# Patient Record
Sex: MALE | Race: WHITE | URBAN - METROPOLITAN AREA
[De-identification: names, ages, dates, MRNs, and addresses within clinical notes are randomized per-mention and may not be internally consistent; named-entity substitution may affect disease eponyms.]

---

## 2018-02-02 ENCOUNTER — EMERGENCY (EMERGENCY)
Facility: HOSPITAL | Age: 38
LOS: 1 days | End: 2018-02-02
Payer: MEDICAID

## 2018-02-02 PROCEDURE — 71046 X-RAY EXAM CHEST 2 VIEWS: CPT | Mod: 26

## 2018-02-02 PROCEDURE — 99284 EMERGENCY DEPT VISIT MOD MDM: CPT

## 2024-08-22 ENCOUNTER — RESULT REVIEW (OUTPATIENT)
Age: 44
End: 2024-08-22

## 2024-08-26 PROBLEM — Z00.00 ENCOUNTER FOR PREVENTIVE HEALTH EXAMINATION: Status: ACTIVE | Noted: 2024-08-26

## 2024-08-30 ENCOUNTER — APPOINTMENT (OUTPATIENT)
Dept: CARDIOLOGY | Facility: CLINIC | Age: 44
End: 2024-08-30
Payer: SELF-PAY

## 2024-08-30 VITALS
OXYGEN SATURATION: 98 % | HEART RATE: 70 BPM | WEIGHT: 187 LBS | BODY MASS INDEX: 29.35 KG/M2 | DIASTOLIC BLOOD PRESSURE: 68 MMHG | SYSTOLIC BLOOD PRESSURE: 124 MMHG | HEIGHT: 67 IN

## 2024-08-30 DIAGNOSIS — Z82.49 FAMILY HISTORY OF ISCHEMIC HEART DISEASE AND OTHER DISEASES OF THE CIRCULATORY SYSTEM: ICD-10-CM

## 2024-08-30 DIAGNOSIS — Z87.898 PERSONAL HISTORY OF OTHER SPECIFIED CONDITIONS: ICD-10-CM

## 2024-08-30 DIAGNOSIS — I25.110 ATHEROSCLEROTIC HEART DISEASE OF NATIVE CORONARY ARTERY WITH UNSTABLE ANGINA PECTORIS: ICD-10-CM

## 2024-08-30 DIAGNOSIS — Z98.890 OTHER SPECIFIED POSTPROCEDURAL STATES: ICD-10-CM

## 2024-08-30 DIAGNOSIS — Z78.9 OTHER SPECIFIED HEALTH STATUS: ICD-10-CM

## 2024-08-30 DIAGNOSIS — I21.3 ST ELEVATION (STEMI) MYOCARDIAL INFARCTION OF UNSPECIFIED SITE: ICD-10-CM

## 2024-08-30 DIAGNOSIS — Z09 ENCOUNTER FOR FOLLOW-UP EXAMINATION AFTER COMPLETED TREATMENT FOR CONDITIONS OTHER THAN MALIGNANT NEOPLASM: ICD-10-CM

## 2024-08-30 DIAGNOSIS — Z95.5 PRESENCE OF CORONARY ANGIOPLASTY IMPLANT AND GRAFT: ICD-10-CM

## 2024-08-30 DIAGNOSIS — I50.20 UNSPECIFIED SYSTOLIC (CONGESTIVE) HEART FAILURE: ICD-10-CM

## 2024-08-30 PROCEDURE — G2211 COMPLEX E/M VISIT ADD ON: CPT

## 2024-08-30 PROCEDURE — 99215 OFFICE O/P EST HI 40 MIN: CPT

## 2024-08-30 PROCEDURE — 93000 ELECTROCARDIOGRAM COMPLETE: CPT

## 2024-08-30 RX ORDER — LOSARTAN POTASSIUM 25 MG/1
25 TABLET, FILM COATED ORAL
Qty: 90 | Refills: 2 | Status: DISCONTINUED | COMMUNITY
End: 2024-08-30

## 2024-08-30 RX ORDER — ASPIRIN 81 MG/1
81 TABLET ORAL
Refills: 0 | Status: ACTIVE | COMMUNITY

## 2024-08-30 RX ORDER — CLOPIDOGREL BISULFATE 75 MG/1
75 TABLET, FILM COATED ORAL DAILY
Qty: 90 | Refills: 1 | Status: ACTIVE | COMMUNITY

## 2024-08-30 RX ORDER — SACUBITRIL AND VALSARTAN 49; 51 MG/1; MG/1
49-51 TABLET, FILM COATED ORAL TWICE DAILY
Qty: 180 | Refills: 2 | Status: ACTIVE | COMMUNITY
Start: 2024-08-30 | End: 1900-01-01

## 2024-08-30 RX ORDER — EMPAGLIFLOZIN 10 MG/1
10 TABLET, FILM COATED ORAL
Qty: 90 | Refills: 2 | Status: ACTIVE | COMMUNITY
Start: 2024-08-30 | End: 1900-01-01

## 2024-08-30 RX ORDER — METOPROLOL SUCCINATE 50 MG/1
50 TABLET, EXTENDED RELEASE ORAL DAILY
Qty: 90 | Refills: 3 | Status: ACTIVE | COMMUNITY
Start: 1900-01-01 | End: 1900-01-01

## 2024-08-30 RX ORDER — ATORVASTATIN CALCIUM 80 MG/1
80 TABLET, FILM COATED ORAL
Qty: 90 | Refills: 3 | Status: ACTIVE | COMMUNITY

## 2024-08-31 NOTE — CARDIOLOGY SUMMARY
[de-identified] : 0830//2024: SR with anterior Q waves and abnormal STT segments consistent with anterior infarct (manually printed ECG, to be scanned) [de-identified] : TTE 08/22/24 1. Left ventricular cavity is normal in size. Left ventricular systolic function is mildly decreased with an ejection fraction visually estimated at 35 to 40 %. 2. Mid and apical anterior wall, mid and apical anterior septum, mid and apical inferior septum, and apex are abnormal. 3. There is mild (grade 1) left ventricular diastolic dysfunction. 4. Pulmonary artery systolic pressure could not be estimated. 5. The inferior vena cava is normal in size measuring 1.27 cm in diameter, (normal <2.1cm) with normal inspiratory collapse (normal >50%) consistent with normal right atrial pressure ( R 3, range 0-5mmHg).  [de-identified] : 08/22-23/24: Emergent PCI of prox LAD and staged PCI of bystander RPL (see above)

## 2024-08-31 NOTE — HISTORY OF PRESENT ILLNESS
[FreeTextEntry1] : 43 year old gentleman with no previously known medical history who presented with substernal chest pressure and ECG significant for anterior lead Q waves and de Winter's T-waves in V2-V3 on 08/21/24 -- taken emergently to CCL on morning of 08/22/24 for successful IVUS-guided PCI of 100% thrombotically occluded proximal LAD with ZAC x 1 (3.5 mm x 38 mm [place]) --- LVEF 40% on left ventriculogram. Staged IVUS-guided PCI of bystander 99% rPL stenosis with ZAC x 1 (3.0 x 15 mm [place]) performed the following day. Forearm hematoma after first PCI aggressively managed with blood-pressure cuff inflation followed by precise ultrasound-directed pressure at small branch microperforation, and pressure wrap with full resolution. Patient obtained emergent insurance with assistance of  and was told that this this post hospital discharge visit would be covered with minimal co-pay -- today's visit cost over $450 dollars.   Patient doing well and re-engaging in all activities. 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 Warm compresses and massage recommended to speed up healing. Medications very expensive given lack of insurance -- Jardiance and entresto samples were kindly coordinated by our RN staff.   RRA site still has 2+ radial pulse. Forearm is soft, but still areas of layered hematoma causing dicomfort. No numbness, tingling. Mild pain with certain positions, but intact strength.  DISPLAY PLAN FREE TEXT

## 2024-08-31 NOTE — CARDIOLOGY SUMMARY
[de-identified] : 0830//2024: SR with anterior Q waves and abnormal STT segments consistent with anterior infarct (manually printed ECG, to be scanned) [de-identified] : TTE 08/22/24 1. Left ventricular cavity is normal in size. Left ventricular systolic function is mildly decreased with an ejection fraction visually estimated at 35 to 40 %. 2. Mid and apical anterior wall, mid and apical anterior septum, mid and apical inferior septum, and apex are abnormal. 3. There is mild (grade 1) left ventricular diastolic dysfunction. 4. Pulmonary artery systolic pressure could not be estimated. 5. The inferior vena cava is normal in size measuring 1.27 cm in diameter, (normal <2.1cm) with normal inspiratory collapse (normal >50%) consistent with normal right atrial pressure ( R 3, range 0-5mmHg).  [de-identified] : 08/22-23/24: Emergent PCI of prox LAD and staged PCI of bystander RPL (see above)

## 2024-08-31 NOTE — DISCUSSION/SUMMARY
[FreeTextEntry1] : 43 year old gentleman who suffered a significant STEMI-equivalent s/p emergent PCI of culprit prox LAD with successful ZAC x 1 followed by staged-PCI of bystander RPL w/ successful ZAC x 1. Ischemic cardiomyopathy with LVEF 40%. Forearm hematoma, nearly resolved, causing mild discomfort. No active extravasation. Asymptomatic on today's visit and feeling well. No recurrence of chest discomfort.   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   - Atorvastatin 80 mg daily   - Switch losartan to entresto 49-51 mg daily   - Start jardiance 10 mg daily   - Increase metoprolol succinate to 50 mg qPM   - Obtain labs, BNP, CMP, A1c, fasting lipid panel, CBC to be completed prior to next visit.   - Significant samples provided.   Return Visit: To be coordinated; advised patient to follow-up on [employer] (paperowrk provided)   Appreciate the opportunity to participate in the care of Mr. MCDUFFIE. Strict ER precautions were provided to patient for symptoms that arise or worsen. Please do not hesitate to reach out with any questions, concerns, or changes in clinical status.   Manjinder Valladares MD, [facility]  Interventional & Clinical Cardiology  Altogether, I had the privilege of spending 62 minutes on this patient's care, more than 50% of which was during a face-to-face encounter. This does not include time required to obtain/interpret an ECG or time invested in the education of medical trainees who may have participated in the patient's care.  [EKG obtained to assist in diagnosis and management of assessed problem(s)] : EKG obtained to assist in diagnosis and management of assessed problem(s)

## 2024-08-31 NOTE — HISTORY OF PRESENT ILLNESS
[FreeTextEntry1] : 43 year old gentleman with no previously known medical history who presented with substernal chest pressure and ECG significant for anterior lead Q waves and de Winter's T-waves in V2-V3 on 08/21/24 -- taken emergently to CCL on morning of 08/22/24 for successful IVUS-guided PCI of 100% thrombotically occluded proximal LAD with ZAC x 1 (3.5 mm x 38 mm [place]) --- LVEF 40% on left ventriculogram. Staged IVUS-guided PCI of bystander 99% rPL stenosis with ZAC x 1 (3.0 x 15 mm [place]) performed the following day. Forearm hematoma after first PCI aggressively managed with blood-pressure cuff inflation followed by precise ultrasound-directed pressure at small branch microperforation, and pressure wrap with full resolution. Patient obtained emergent insurance with assistance of  and was told that this this post hospital discharge visit would be covered with minimal co-pay -- today's visit cost over $450 dollars.   Patient doing well and re-engaging in all activities. 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 Warm compresses and massage recommended to speed up healing. Medications very expensive given lack of insurance -- Jardiance and entresto samples were kindly coordinated by our RN staff.   RRA site still has 2+ radial pulse. Forearm is soft, but still areas of layered hematoma causing dicomfort. No numbness, tingling. Mild pain with certain positions, but intact strength.

## 2024-08-31 NOTE — DISCUSSION/SUMMARY
[FreeTextEntry1] : 43 year old gentleman who suffered a significant STEMI-equivalent s/p emergent PCI of culprit prox LAD with successful ZAC x 1 followed by staged-PCI of bystander RPL w/ successful ZAC x 1. Ischemic cardiomyopathy with LVEF 40%. Forearm hematoma, nearly resolved, causing mild discomfort. No active extravasation. Asymptomatic on today's visit and feeling well. No recurrence of chest discomfort.   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   - Atorvastatin 80 mg daily   - Switch losartan to entresto 49-51 mg daily   - Start jardiance 10 mg daily   - Increase metoprolol succinate to 50 mg qPM   - Obtain labs, BNP, CMP, A1c, fasting lipid panel, CBC to be completed prior to next visit.   - Significant samples provided.   Return Visit: To be coordinated; advised patient to follow-up on [employer] (paperowrk provided)   Appreciate the opportunity to participate in the care of Mr. MCDUFFIE. Strict ER precautions were provided to patient for symptoms that arise or worsen. Please do not hesitate to reach out with any questions, concerns, or changes in clinical status.   Manjinder Valladares MD, [facility]  Interventional & Clinical Cardiology  Altogether, I had the privilege of spending 62 minutes on this patient's care, more than 50% of which was during a face-to-face encounter. This does not include time required to obtain/interpret an ECG or time invested in the education of medical trainees who may have participated in the patient's care.  [EKG obtained to assist in diagnosis and management of assessed problem(s)] : EKG obtained to assist in diagnosis and management of assessed problem(s)

## 2024-11-14 ENCOUNTER — APPOINTMENT (OUTPATIENT)
Dept: CARDIOLOGY | Facility: CLINIC | Age: 44
End: 2024-11-14
Payer: SELF-PAY

## 2024-11-14 ENCOUNTER — NON-APPOINTMENT (OUTPATIENT)
Age: 44
End: 2024-11-14

## 2024-11-14 VITALS
WEIGHT: 182 LBS | SYSTOLIC BLOOD PRESSURE: 108 MMHG | OXYGEN SATURATION: 99 % | BODY MASS INDEX: 28.56 KG/M2 | HEART RATE: 77 BPM | HEIGHT: 67 IN | DIASTOLIC BLOOD PRESSURE: 62 MMHG

## 2024-11-14 DIAGNOSIS — Z95.5 PRESENCE OF CORONARY ANGIOPLASTY IMPLANT AND GRAFT: ICD-10-CM

## 2024-11-14 DIAGNOSIS — I50.20 UNSPECIFIED SYSTOLIC (CONGESTIVE) HEART FAILURE: ICD-10-CM

## 2024-11-14 DIAGNOSIS — I21.3 ST ELEVATION (STEMI) MYOCARDIAL INFARCTION OF UNSPECIFIED SITE: ICD-10-CM

## 2024-11-14 DIAGNOSIS — I25.110 ATHEROSCLEROTIC HEART DISEASE OF NATIVE CORONARY ARTERY WITH UNSTABLE ANGINA PECTORIS: ICD-10-CM

## 2024-11-14 PROCEDURE — 99214 OFFICE O/P EST MOD 30 MIN: CPT

## 2024-11-14 PROCEDURE — G2211 COMPLEX E/M VISIT ADD ON: CPT

## 2024-11-14 RX ORDER — DAPAGLIFLOZIN 5 MG/1
5 TABLET, FILM COATED ORAL DAILY
Qty: 30 | Refills: 2 | Status: ACTIVE | COMMUNITY
Start: 2024-11-14 | End: 1900-01-01

## 2024-11-14 RX ORDER — SACUBITRIL AND VALSARTAN 49; 51 MG/1; MG/1
49-51 TABLET, FILM COATED ORAL TWICE DAILY
Qty: 60 | Refills: 3 | Status: ACTIVE | COMMUNITY
Start: 2024-11-14 | End: 1900-01-01

## 2024-12-02 ENCOUNTER — NON-APPOINTMENT (OUTPATIENT)
Age: 44
End: 2024-12-02

## 2024-12-13 ENCOUNTER — APPOINTMENT (OUTPATIENT)
Dept: CARDIOLOGY | Facility: CLINIC | Age: 44
End: 2024-12-13

## 2024-12-13 RX ORDER — LOSARTAN POTASSIUM 100 MG/1
100 TABLET, FILM COATED ORAL
Qty: 90 | Refills: 2 | Status: ACTIVE | COMMUNITY
Start: 2024-12-13 | End: 1900-01-01

## 2024-12-13 RX ORDER — PSYLLIUM SEED
58.6 PACKET (EA) ORAL
Qty: 1 | Refills: 2 | Status: ACTIVE | COMMUNITY
Start: 2024-12-13 | End: 1900-01-01

## 2025-03-11 ENCOUNTER — LABORATORY RESULT (OUTPATIENT)
Age: 45
End: 2025-03-11

## 2025-03-27 ENCOUNTER — NON-APPOINTMENT (OUTPATIENT)
Age: 45
End: 2025-03-27

## 2025-03-27 ENCOUNTER — APPOINTMENT (OUTPATIENT)
Dept: CARDIOLOGY | Facility: CLINIC | Age: 45
End: 2025-03-27
Payer: SELF-PAY

## 2025-03-27 VITALS
HEART RATE: 84 BPM | HEIGHT: 67 IN | DIASTOLIC BLOOD PRESSURE: 80 MMHG | BODY MASS INDEX: 29.03 KG/M2 | OXYGEN SATURATION: 98 % | SYSTOLIC BLOOD PRESSURE: 110 MMHG | WEIGHT: 185 LBS

## 2025-03-27 DIAGNOSIS — I50.20 UNSPECIFIED SYSTOLIC (CONGESTIVE) HEART FAILURE: ICD-10-CM

## 2025-03-27 DIAGNOSIS — Z09 ENCOUNTER FOR FOLLOW-UP EXAMINATION AFTER COMPLETED TREATMENT FOR CONDITIONS OTHER THAN MALIGNANT NEOPLASM: ICD-10-CM

## 2025-03-27 DIAGNOSIS — I25.110 ATHEROSCLEROTIC HEART DISEASE OF NATIVE CORONARY ARTERY WITH UNSTABLE ANGINA PECTORIS: ICD-10-CM

## 2025-03-27 DIAGNOSIS — I21.3 ST ELEVATION (STEMI) MYOCARDIAL INFARCTION OF UNSPECIFIED SITE: ICD-10-CM

## 2025-03-27 DIAGNOSIS — Z95.5 PRESENCE OF CORONARY ANGIOPLASTY IMPLANT AND GRAFT: ICD-10-CM

## 2025-03-27 PROCEDURE — G2211 COMPLEX E/M VISIT ADD ON: CPT

## 2025-03-27 PROCEDURE — 99215 OFFICE O/P EST HI 40 MIN: CPT

## 2025-03-27 PROCEDURE — 93000 ELECTROCARDIOGRAM COMPLETE: CPT

## 2025-06-26 ENCOUNTER — APPOINTMENT (OUTPATIENT)
Dept: CARDIOLOGY | Facility: CLINIC | Age: 45
End: 2025-06-26

## 2025-06-26 ENCOUNTER — NON-APPOINTMENT (OUTPATIENT)
Age: 45
End: 2025-06-26